# Patient Record
Sex: FEMALE | Race: BLACK OR AFRICAN AMERICAN | NOT HISPANIC OR LATINO
[De-identification: names, ages, dates, MRNs, and addresses within clinical notes are randomized per-mention and may not be internally consistent; named-entity substitution may affect disease eponyms.]

---

## 2021-11-30 PROBLEM — Z00.00 ENCOUNTER FOR PREVENTIVE HEALTH EXAMINATION: Status: ACTIVE | Noted: 2021-11-30

## 2021-12-01 ENCOUNTER — APPOINTMENT (OUTPATIENT)
Dept: ORTHOPEDIC SURGERY | Facility: CLINIC | Age: 61
End: 2021-12-01
Payer: COMMERCIAL

## 2021-12-01 VITALS — HEIGHT: 63 IN | BODY MASS INDEX: 28.7 KG/M2 | WEIGHT: 162 LBS

## 2021-12-01 DIAGNOSIS — Z87.39 PERSONAL HISTORY OF OTHER DISEASES OF THE MUSCULOSKELETAL SYSTEM AND CONNECTIVE TISSUE: ICD-10-CM

## 2021-12-01 DIAGNOSIS — D68.9 COAGULATION DEFECT, UNSPECIFIED: ICD-10-CM

## 2021-12-01 DIAGNOSIS — S46.002A UNSPECIFIED INJURY OF MUSCLE(S) AND TENDON(S) OF THE ROTATOR CUFF OF LEFT SHOULDER, INITIAL ENCOUNTER: ICD-10-CM

## 2021-12-01 PROCEDURE — 20611 DRAIN/INJ JOINT/BURSA W/US: CPT | Mod: LT

## 2021-12-01 PROCEDURE — 73030 X-RAY EXAM OF SHOULDER: CPT | Mod: LT

## 2021-12-01 PROCEDURE — 99204 OFFICE O/P NEW MOD 45 MIN: CPT | Mod: 25

## 2021-12-01 RX ORDER — TRAMADOL HYDROCHLORIDE 50 MG/1
50 TABLET, COATED ORAL
Qty: 60 | Refills: 0 | Status: ACTIVE | COMMUNITY
Start: 2021-12-01 | End: 1900-01-01

## 2021-12-01 NOTE — HISTORY OF PRESENT ILLNESS
[de-identified] : CHRONIC LEFT SHOULDER PAIN \par PAIN STARTED AUGUST 2021- MIGHT HAVE BEEN FROM CARRYING A BAG \par RADIATE UP NECK \par ACHY \par PAIN LEVEL: 10/10\par BETTER WITH MOTRIN, TYLENOL,REST, HEAT\par WORSE WITH OVER HEAD LIFTING, REACHING BEHIND, LYING DOWN, SITTING, AT NIGHT \par PT IS GOING TO P.T SINCE AUGUST 2021\par PT HAD CORTISONE INJECTION INJ IN HE PAST- HELPFUL \par PT WAS TOLD SHE HAD A PARTIAL TEAR IN THE PAST \par

## 2021-12-01 NOTE — DISCUSSION/SUMMARY
[de-identified] : ULTRASOUND EVALUATION  REVEALS INFLAMMATORY CHANGES WITHOUT PARTIAL TEAR \par PATIENT HAS ELECTED TO PROCEED WITH KENALOG INJECTION SHOULDER \par RISKS AND BENEFITS DISCUSSED - VERBAL CONSENT OBTAINED \par \par \par POST INJECTION INSTRUCTIONS\par \par COLD THERAPY , ANALGESICS PRN\par \par HOME  EXERCISES QD  PENDULUM / AAROM HANDOUT PROVIDED, REVIEWED AND DEMONSTRATED \par \par MRI TO EVALUATE TEAR \par

## 2021-12-01 NOTE — PROCEDURE
[de-identified] : DIAGNOSTIC ULTRASOUND LEFT SHOULDER\par \par DIAGNOSTIC SONOGRAPHY of the Rotator Cuff Soft Tissue of the LEFT  SHOULDER was performed in Multiple Scan Planes with varying transducer frequencies.\par Imaging of the Supraspinatus Tendon reveals TENDONITIS, BURSITIS, ARTICULAR SIDE DEGENERATION  WITH OUT SIGNIFICANT / COMPLETE TEAR\par Imaging of the Biceps Tendon reveals no significant tear.\par Imaging of the Subscapularis Tendon reveals no significant tear.\par Imaging of the Infraspinatus Tendon reveals no significant tear.\par Key images were save digitally and reviewed with patient.\par \par \par INJECTION LEFT SHOULDER SA SPACE\par \par Patient has demonstrated limited relief from NSAIDS, rest, exercises / PT, and after discussion of the risks and benefits, the patient has elected to proceed with an ULTRASOUND GUIDED injection into the LEFT SUBACROMIAL  SPACE LATERAL APPROACH \par  \par Confirmed that the patient does not have history of prior adverse reactions, active, infections, or relevant allergies. There was no effusion, erythema, or warmth, and the skin was clear\par \par The skin was sterilized with alcohol. Ethyl Chloride was used as a topical anesthetic. Routine sterile technique. \par The site was injected UTILIZING ULTRASOUND GUIDANCE to confirm appropriate placement of the needle-\par with a mixture of medication and local anesthetic. The injection was completed without complication and a bandage was applied.\par  \par The patient tolerated the procedure well and was given post-injection instructions.Rec: Cold therapy, analgesics, avoid heavy activity.\par MEDICATION: 4cc of 1% xylocaine + 10mg of KENALOG\par \par

## 2021-12-01 NOTE — PHYSICAL EXAM
[de-identified] : PHYSICAL EXAM LEFT  SHOULDER\par \par MILD  SCAPULAR PROTRACTION\par AROM 70 / 70 \par TENDER: SA REGION ANTERIOR AND LATERAL\par \par SPECIAL TESTING :\par MERA - POSITIVE \par EPIFANIO - POSITIVE \par SPEED TEST - POSITIVE\par \par ARAGON - NEGATIVE \par APPREHENSION AND SUPPRESSION - NEGATIVE \par \par RC STRENGTH TESTING \par SS:  5/5\par SUB 5/5\par IS     5/5\par BICEPS  5/5\par \par SENSATION  - GROSSLY INTACT\par \par \par  [de-identified] : LEFT SHOULDER XRAY (2 VIEWS - AP AND OUTLET) -  \par NO OBVIOUS FRACTURE , SEPARATION OR DISLOCATION \par NO SIGNIFICANT OSTEOARTHRITIS, \par TYPE 2B ACROMION - LOW LYING\par CSA=34.3\par

## 2021-12-22 ENCOUNTER — APPOINTMENT (OUTPATIENT)
Dept: ORTHOPEDIC SURGERY | Facility: CLINIC | Age: 61
End: 2021-12-22
Payer: COMMERCIAL

## 2021-12-22 PROCEDURE — 99213 OFFICE O/P EST LOW 20 MIN: CPT | Mod: 95

## 2021-12-22 NOTE — DISCUSSION/SUMMARY
[de-identified] : SHOULDER SURGERY DISCUSSION:\par \par PROCEDURE DISCUSSED - QUESTIONS ANSWERED\par PATIENT WISHES TO PROCEED\par \par POST OP CARE AND LIMITATIONS REVIEWED - HANDOUT PROVIDED \par \par COLD PACKS RECOMMENDED\par ANALGESICS PRESCRIBED \par \par \par THERE ARE NO GUARANTEES THAT ALL SYMPTOMS WILL BE ALLEVIATED  \par SHOULDER ARTHROSCOPY, ACROMIOPLASTY, DEBRIDEMENT,  RC REPAIR AND LABRUM REPAIRS- ON AVERAGE 75- 85% SATISFACTORY RESULTS FOR TEARS < 3CM AFTER 9-12 MONTHS HEALING AND REHABILITATION. \par \par REPAIRS WILL REQUIRE STRICT SHOULDER IMMOBILIZER 4-6 WEEKS\par \par RC TEARS 3CM OR LARGER MAY REQUIRE COLLAGEN PATCH AUGMENTATION GENERALLY HAVE LESS SATISFACTORY RESULTS\par \par PHYSICAL THERAPY REQUIRED 2X WEEK FOR  MINIMUM 8-12 WEEKS FOR ALL PROCEDURES \par CONTINUED HOME EXERCISES 6-9 MONTHS AFTER THAT REQUIRED FOR OPTIMAL OUTCOMES \par \par ROUTINE SURGICAL AND ANESTHETIC RISKS INCLUDE RISK OF SURGICAL INFECTION, ANESTHETIC COMPLICATION OR ALLERGY, POSSIBLE RETEARS OR PROGRESSION OF TEAR, STIFFNESS OF SHOULDER AND UNSATISFACTORY OUTCOMES\par \par PATIENT UNDERSTANDS AND WISHES TO PROCEED\par

## 2021-12-22 NOTE — HISTORY OF PRESENT ILLNESS
[de-identified] : CHRONIC LEFT SHOULDER PAIN \par PAIN STARTED AUGUST 2021- MIGHT HAVE BEEN FROM CARRYING A BAG \par DEC 1, 2021- CORTISONE INJ- HELPFUL \par PAIN LEVEL: 5/10, 9-10/10 AT NIGHT \par BETTER WITH MOTRIN, TYLENOL,REST, HEAT\par WORSE WITH OVER HEAD LIFTING, REACHING BEHIND, LYING DOWN, SITTING, AT NIGHT \par PT IS GOING TO P.T SINCE AUGUST 2021 - NO RELIEF\par PT HAD CORTISONE INJECTION INJ IN HE PAST- ONLY TEMPORARY RELIEF\par PT WAS TOLD SHE HAD A PARTIAL TEAR IN THE PAST \par MRI RESULTS TODAY \par

## 2021-12-22 NOTE — PHYSICAL EXAM
[de-identified] : PHYSICAL EXAM LEFT  SHOULDER - LAST VISIT\par \par MILD  SCAPULAR PROTRACTION\par AROM 70 / 70 \par TENDER: SA REGION ANTERIOR AND LATERAL\par \par SPECIAL TESTING :\par MERA - POSITIVE \par EPIFANIO - POSITIVE \par SPEED TEST - POSITIVE\par \par ARAGON - NEGATIVE \par APPREHENSION AND SUPPRESSION - NEGATIVE \par \par RC STRENGTH TESTING \par SS:  5/5\par SUB 5/5\par IS     5/5\par BICEPS  5/5\par \par SENSATION  - GROSSLY INTACT\par \par \par  [de-identified] : IMPRESSION:  MRI of the left shoulder demonstrates:\par \par Supraspinatus tendinosis with partial-thickness articular surface and bursal surface tears of anterior supraspinatus tendon.\par \par Infraspinatus tendinosis with low-grade partial-thickness articular surface tear.\par \par Subscapularis tendinosis.\par \par Mild intramuscular edema at inferoposterior aspect of supraspinatus muscle; normal remaining muscles of rotator cuff.\par \par Superoposterior labral tear (SLAP 2).\par \par Mild osteoarthritis at glenohumeral joint.\par \par Small effusion at glenohumeral joint.\par \par Attenuation of intracapsular portion of long bicipital tendon consistent with partial tear.\par \par Mild thickening of axillary recess and fibrosis in rotator interval, which may reflect adhesive capsulitis; recommend clinical correlation.\par \par Mild osteoarthritis at acromioclavicular joint. Downsloping acromion. Small acromial spur.\par \par Mild subacromial-subdeltoid bursitis.

## 2022-01-12 ENCOUNTER — APPOINTMENT (OUTPATIENT)
Dept: ORTHOPEDIC SURGERY | Facility: CLINIC | Age: 62
End: 2022-01-12
Payer: COMMERCIAL

## 2022-01-12 PROCEDURE — 99213 OFFICE O/P EST LOW 20 MIN: CPT | Mod: 95

## 2022-01-12 RX ORDER — ONDANSETRON 8 MG/1
8 TABLET, ORALLY DISINTEGRATING ORAL 3 TIMES DAILY
Qty: 15 | Refills: 0 | Status: ACTIVE | COMMUNITY
Start: 2022-01-12 | End: 1900-01-01

## 2022-01-12 RX ORDER — OXYCODONE AND ACETAMINOPHEN 7.5; 325 MG/1; MG/1
7.5-325 TABLET ORAL
Qty: 42 | Refills: 0 | Status: ACTIVE | COMMUNITY
Start: 2022-01-12 | End: 1900-01-01

## 2022-01-12 NOTE — HISTORY OF PRESENT ILLNESS
[Other Location: e.g. School (Enter Location, City,State)___] : at [unfilled], at the time of the visit. [Medical Office: ([facility])___] : at the medical office located in  [Verbal consent obtained from patient] : the patient, [unfilled] [de-identified] : CHRONIC LEFT SHOULDER PAIN \par PAIN STARTED AUGUST 2021- MIGHT HAVE BEEN FROM CARRYING A BAG \par DEC 1, 2021- CORTISONE INJ- HELPFUL \par PAIN LEVEL: 5/10 \par JAN 18, 2022- LEFT POSSIBLE REPAIR PARTIAL, RANDALL, DEBRIDEMENT, SYNOVECTOMY \par BETTER WITH MOTRIN, TYLENOL,REST, HEAT\par WORSE WITH OVER HEAD LIFTING, REACHING BEHIND, LYING DOWN, SITTING, AT NIGHT \par PT IS GOING TO P.T SINCE AUGUST 2021 - NO RELIEF\par PT HAD CORTISONE INJECTION INJECTION IN HE PAST- ONLY TEMPORARY RELIEF\par PT WAS TOLD SHE HAD A PARTIAL TEAR IN THE PAST \par ICE AND MEDICATION AS NEEDED \par

## 2022-01-15 ENCOUNTER — LABORATORY RESULT (OUTPATIENT)
Age: 62
End: 2022-01-15

## 2022-01-18 ENCOUNTER — APPOINTMENT (OUTPATIENT)
Dept: ORTHOPEDIC SURGERY | Facility: AMBULATORY SURGERY CENTER | Age: 62
End: 2022-01-18
Payer: COMMERCIAL

## 2022-01-18 PROCEDURE — 29826 SHO ARTHRS SRG DECOMPRESSION: CPT | Mod: LT

## 2022-01-18 PROCEDURE — 29820 SHO ARTHRS SRG PRTL SYNVCT: CPT | Mod: LT,59

## 2022-01-18 PROCEDURE — 29823 SHO ARTHRS SRG XTNSV DBRDMT: CPT | Mod: LT,59

## 2022-01-21 ENCOUNTER — APPOINTMENT (OUTPATIENT)
Dept: ORTHOPEDIC SURGERY | Facility: CLINIC | Age: 62
End: 2022-01-21
Payer: COMMERCIAL

## 2022-01-21 PROCEDURE — 99024 POSTOP FOLLOW-UP VISIT: CPT

## 2022-01-21 PROCEDURE — 73030 X-RAY EXAM OF SHOULDER: CPT | Mod: LT

## 2022-01-21 RX ORDER — IBUPROFEN 600 MG/1
600 TABLET, FILM COATED ORAL 3 TIMES DAILY
Qty: 90 | Refills: 0 | Status: ACTIVE | COMMUNITY
Start: 2022-01-21 | End: 1900-01-01

## 2022-01-21 NOTE — PHYSICAL EXAM
[de-identified] : POST OP SHOULDER EXAM \par \par PORTALS HEALING WELL - NO ERYTHEMA OR CALOR\par SUTURES REMOVED, STERISTRIPS AND WATERPROOF BANDAIDS  APPLIED \par \par AROM  PEND AAROM \par \par DISTAL CMS INTACT\par  [de-identified] : LEFT SHOULDER XRAY (2 VIEWS - AP AND OUTLET)  -  \par NO OBVIOUS FRACTURE OR DISLOCATION, \par SATISFACTORY DECOMPRESSION NOTED  , \par \par

## 2022-01-21 NOTE — HISTORY OF PRESENT ILLNESS
[de-identified] : CHRONIC LEFT SHOULDER PAIN \par PAIN STARTED AUGUST 2021- MIGHT HAVE BEEN FROM CARRYING A BAG \par DEC 1, 2021- CORTISONE INJ- HELPFUL \par PAIN LEVEL:4 /10 \par JANUARY 18, 2022 - LEFT RANDALL, PASTA 3-4MM\par BETTER WITH MOTRIN, TYLENOL,REST, HEAT\par WORSE WITH OVER HEAD LIFTING, REACHING BEHIND, LYING DOWN, SITTING, AT NIGHT \par PT IS GOING TO P.T SINCE AUGUST 2021 - NO RELIEF\par PT HAD CORTISONE INJECTION INJECTION IN HE PAST- ONLY TEMPORARY RELIEF\par PT WAS TOLD SHE HAD A PARTIAL TEAR IN THE PAST \par ICE AND MEDICATION AS NEEDED \par

## 2022-01-21 NOTE — DISCUSSION/SUMMARY
[de-identified] : JANUARY 18, 2022 - LEFT RANDALL, PASTA 3-4MM..\par \par POST OP RANDALL\par \par COLD THERAPY,ANALGESICS AS NEEDED- WEAN NARCOTICS\par \par DISCONTINUE SLING  - DESKTOP WORK ALLOWED\par \par START PENDULUM EXERCISES, AAROM - DEMONSTARTED \par \par RESUME CARDIO 2 WEEKS - NO SHOULDER CHEST WEIGHTS\par \par START PT WITHIMN 7-14 DAY\par OFFICE FU 6 WEEKS\par

## 2022-03-04 ENCOUNTER — APPOINTMENT (OUTPATIENT)
Dept: ORTHOPEDIC SURGERY | Facility: CLINIC | Age: 62
End: 2022-03-04
Payer: COMMERCIAL

## 2022-03-04 PROCEDURE — 99024 POSTOP FOLLOW-UP VISIT: CPT

## 2022-03-04 NOTE — PHYSICAL EXAM
[de-identified] : POST OP SHOULDER EXAM \par \par PORTALS HEALING WELL - NO ERYTHEMA OR CALOR\par  \par \par AROM  120 / 110 \par \par DISTAL CMS INTACT\par

## 2022-03-04 NOTE — RETURN TO WORK/SCHOOL
[Return Date: _____] : as of [unfilled].  This has been discussed in detail with ~Orly~ and ~he/she~ understands this. [Full Duty] : full duty

## 2022-03-04 NOTE — HISTORY OF PRESENT ILLNESS
[de-identified] : CHRONIC LEFT SHOULDER PAIN \par PAIN LEVEL:5/10 \par JANUARY 18, 2022 - LEFT RANDALL, PASTA 3-4MM\par GOING TO P.T. 3 X WEEKS \par NOT TAKING OXYCODONE \par \par \par \par PAIN STARTED AUGUST 2021- MIGHT HAVE BEEN FROM CARRYING A BAG \par DEC 1, 2021- CORTISONE INJ- HELPFUL \par PAIN LEVEL:5/10 \par JANUARY 18, 2022 - LEFT RANDALL, PASTA 3-4MM\par BETTER WITH MOTRIN, TYLENOL,REST, HEAT\par WORSE WITH OVER HEAD LIFTING, REACHING BEHIND, LYING DOWN, SITTING, AT NIGHT \par PT IS GOING TO P.T SINCE AUGUST 2021 - NO RELIEF\par PT HAD CORTISONE INJECTION INJECTION IN HE PAST- ONLY TEMPORARY RELIEF\par PT WAS TOLD SHE HAD A PARTIAL TEAR IN THE PAST \par

## 2022-03-04 NOTE — DISCUSSION/SUMMARY
[de-identified] : JANUARY 18, 2022 - LEFT RANDALL, PASTA 3-4MM..\par \par POST OP RANDALL\par \par COLD THERAPY,ANALGESICS AS NEEDED- WEAN NARCOTICS\par \par DISCONTINUE SLING  - DESKTOP WORK ALLOWED\par \par START PENDULUM EXERCISES, AAROM - DEMONSTARTED \par \par RESUME CARDIO 2 WEEKS - NO SHOULDER CHEST WEIGHTS\par \par START PT WITHIMN 7-14 DAY\par OFFICE FU 6 WEEKS\par

## 2022-04-04 ENCOUNTER — APPOINTMENT (OUTPATIENT)
Dept: ORTHOPEDIC SURGERY | Facility: CLINIC | Age: 62
End: 2022-04-04
Payer: COMMERCIAL

## 2022-04-04 DIAGNOSIS — M75.42 IMPINGEMENT SYNDROME OF LEFT SHOULDER: ICD-10-CM

## 2022-04-04 DIAGNOSIS — M75.112 INCOMPLETE ROTATOR CUFF TEAR OR RUPTURE OF LEFT SHOULDER, NOT SPECIFIED AS TRAUMATIC: ICD-10-CM

## 2022-04-04 PROCEDURE — 99024 POSTOP FOLLOW-UP VISIT: CPT

## 2022-04-04 NOTE — DISCUSSION/SUMMARY
[de-identified] : JANUARY 18, 2022 - LEFT RANDALL, PASTA 3-4MM..\par \par POST OP RANDALL\par \par HEAT OR COLD THERAPY,ANALGESICS AS NEEDED- WEAN NARCOTICS\par \par COMPLETE P.T.\par PROGRESS TO HOME EXERCISES\par I DEMONSTRATED ER AND OR STRETCHING DAILY \par \par \par \par \par

## 2022-04-04 NOTE — PHYSICAL EXAM
[de-identified] : POST OP SHOULDER EXAM \par \par PORTALS HEALING WELL - NO ERYTHEMA OR CALOR\par  \par \par AROM  LEFT  135 / 120 / 70 / 20\par \par DISTAL CMS INTACT\par

## 2022-04-04 NOTE — HISTORY OF PRESENT ILLNESS
[de-identified] :  LEFT SHOULDER POSTOP 12 WEEKS \par PAIN LEVEL: 1/10 \par JANUARY 18, 2022 - LEFT RANDALL, PASTA 3-4MM\par PATIENT IS GOING TO P.T 2 X WEEK- HELPFUL \par AT HOME EXERCISES-HELPFUL  \par NOT TAKING OXYCODONE \par \par \par \par PAIN STARTED AUGUST 2021- MIGHT HAVE BEEN FROM CARRYING A BAG \par DEC 1, 2021- CORTISONE INJ- HELPFUL \par PAIN LEVEL:5/10 \par JANUARY 18, 2022 - LEFT RANDALL, PASTA 3-4MM\par BETTER WITH MOTRIN, TYLENOL,REST, HEAT\par WORSE WITH OVER HEAD LIFTING, REACHING BEHIND, LYING DOWN, SITTING, AT NIGHT \par PT IS GOING TO P.T SINCE AUGUST 2021 - NO RELIEF\par PT HAD CORTISONE INJECTION INJECTION IN HE PAST- ONLY TEMPORARY RELIEF\par PT WAS TOLD SHE HAD A PARTIAL TEAR IN THE PAST \par

## 2022-09-13 ENCOUNTER — APPOINTMENT (OUTPATIENT)
Dept: OTOLARYNGOLOGY | Facility: CLINIC | Age: 62
End: 2022-09-13

## 2022-09-13 ENCOUNTER — NON-APPOINTMENT (OUTPATIENT)
Age: 62
End: 2022-09-13

## 2022-09-13 VITALS
SYSTOLIC BLOOD PRESSURE: 118 MMHG | HEART RATE: 79 BPM | DIASTOLIC BLOOD PRESSURE: 73 MMHG | HEIGHT: 63 IN | WEIGHT: 164 LBS | TEMPERATURE: 97.8 F | BODY MASS INDEX: 29.06 KG/M2

## 2022-09-13 PROCEDURE — 31579 LARYNGOSCOPY TELESCOPIC: CPT

## 2022-09-13 PROCEDURE — 99205 OFFICE O/P NEW HI 60 MIN: CPT | Mod: 25

## 2022-09-13 RX ORDER — OMEPRAZOLE 40 MG/1
40 CAPSULE, DELAYED RELEASE ORAL
Qty: 30 | Refills: 3 | Status: ACTIVE | COMMUNITY
Start: 2022-09-13 | End: 1900-01-01

## 2022-09-13 RX ORDER — FAMOTIDINE 20 MG/1
20 TABLET, FILM COATED ORAL
Qty: 30 | Refills: 3 | Status: ACTIVE | COMMUNITY
Start: 2022-09-13 | End: 1900-01-01

## 2022-09-13 NOTE — ASSESSMENT
[FreeTextEntry1] :  62-year-old female who presents for second opinion.  In the past she states that she has had tonsil stones as well as vocal fold nodules.  She has undergone voice therapy for the nodules with some improvement in terms of voice.  That being said her biggest  complaints are throat closing when lying supine and sleeping, feeling like throat is closing.   she does have a known diagnosis of THELMA but does not use CPAP.   I do recommend continued follow-up with sleep surgery.  Also recommending continued follow-up with speech pathology for the vocal fold nodules.\par \par In terms of the sensation of throat closing and tightening when lying supine as well as mucus production, based on history and physical exam, I do believe there is a component of laryngopharyngeal reflux.  At this time I am recommending dietary and behavioral change to reduce acid in the diet.  I am also recommending omeprazole as well famotidine for a 3 months trial.\par \par - Recommend continuing follow-up with sleep surgery\par -  continue to work with speech therapy\par - Dietary and behavioral modification to reduce acid reflux, handout given\par - Omeprazole qd as well as Famotidine qhs\par - Voice hygiene, increase hydration, sips of water throughout the day, avoid throat clearing\par - fu 3 mo\par

## 2022-09-13 NOTE — HISTORY OF PRESENT ILLNESS
[de-identified] : 63 yo female who presents with concern for tonsil stones and vocal nodules.\par \par She has had tonsil stones for 3 years.  She will get this constantly, but they don't come out.  She feels that there is something in her throat, worse on the left side.  She feels that there is mucus that comes up, especially at night time.  No issues chewing, eating, or swallowing.  She will have issues with "breathing" where she feels that her throat is closing at night time.\par \par She notes that her voice can get hoarse, tired and dry.  This has been present for 4 years now.  This will come and go.  Today she feels well.  She was recently dx with vocal fold nodules at [facility], and she underwent voice therapy while there.  Has known acid reflux x 2 years, and takes pepcid every night depending on diet.  Has had sleep study in the past with mod THELMA,\par \par Diet:\par + 1 cup caffeine daily, mint gum, limited tomato, onion, garlic,   \par - chocolate, blueberries, alcohol\par 3 glasses of water daily

## 2022-09-13 NOTE — PROCEDURE
[de-identified] : -\par Procedure Note\par \par Pre-operative Diagnosis:  dysphonia discomfort\par Post-operative Diagnosis:  laryngopharyngeal reflux, vocal fold nodules\par Anesthesia: Topical - 1 % Lidocaine/Phenylephrine\par Procedure:  Flexible Laryngoscopy with Stroboscopy\par  \par Procedure Details:  \par The patient was placed in the sitting position.  After decongestant and anesthesia were applied the laryngoscope was passed.  The nasal cavities, nasopharynx, oropharynx, hypopharynx, and larynx were all examined.  Vocal folds were examined during respiration and phonation.  The following findings were noted:\par \par Findings:  \par Nose: Septum is midline, turbinates are normal, nasal airways patent, mucosa normal\par Nasopharynx: Adenoids normal, no masses, eustachian tube normal\par Oropharynx: Pharyngeal walls symmetric and without lesion. Tonsils/fossae symmetric\par Hypopharynx: Hypopharynx and pyriform sinuses without lesion. No masses or asymmetry.  No pooling of secretions.\par Larynx:  Epiglottis and aryepiglottic folds were sharp and crisp bilaterally.  Bilateral false vocal folds normal appearance. Airway was widely patent.  +post cricoid edema\par \par Strobe Exam Ratings\par 		\par TVF Appearance: small vocal fold nodule\par TVF Mobility:  normal\par Edema/hypertrophy:  post cricoid\par Mucus on TVF:  minimal\par Glottic Closure:  adequate\par Mucosal Wave:  normal\par Amplitude of Vibration:  normal\par Phase:  symmetric\par Supraglottic Hyperfunction:  minimal\par Other Findings:\par \par Condition: Stable.  Patient tolerated procedure well.\par \par Complications: None\par

## 2022-09-21 ENCOUNTER — APPOINTMENT (OUTPATIENT)
Dept: ORTHOPEDIC SURGERY | Facility: CLINIC | Age: 62
End: 2022-09-21

## 2022-09-21 DIAGNOSIS — M75.41 IMPINGEMENT SYNDROME OF RIGHT SHOULDER: ICD-10-CM

## 2022-09-21 DIAGNOSIS — S46.001A UNSPECIFIED INJURY OF MUSCLE(S) AND TENDON(S) OF THE ROTATOR CUFF OF RIGHT SHOULDER, INITIAL ENCOUNTER: ICD-10-CM

## 2022-09-21 PROCEDURE — 73030 X-RAY EXAM OF SHOULDER: CPT | Mod: RT

## 2022-09-21 PROCEDURE — 99214 OFFICE O/P EST MOD 30 MIN: CPT | Mod: 25

## 2022-09-21 PROCEDURE — 20611 DRAIN/INJ JOINT/BURSA W/US: CPT

## 2022-09-21 NOTE — HISTORY OF PRESENT ILLNESS
[de-identified] : RIGHT SHOULDER PAIN \par PAIN STARED 3 MONTHS AGO-NO SPECIFIC INJURY \par WORSE AT NIGHT, SLEEPING, LYING DOWN, OVER HEAD LIFTING, LIMITED ROM \par PAIN LEVEL: 5/10 \par ACHY \par BETTER WITH IBUPROFEN, RESTING , EXERCISE \par

## 2022-09-21 NOTE — PROCEDURE
[de-identified] : DIAGNOSTIC ULTRASOUND RIGHT SHOULDER\par \par DIAGNOSTIC SONOGRAPHY of the Rotator Cuff Soft Tissue of the RIGHT SHOULDER was performed in Multiple Scan Planes with varying transducer frequencies.\par Imaging of the Supraspinatus Tendon reveals TENDONITIS, BURSITIS, BURSAL SIDE FRAYING WITH OUT SIGNIFICANT / COMPLETE TEAR\par Imaging of the Biceps Tendon reveals no significant tear.\par Imaging of the Subscapularis Tendon reveals no significant tear.\par Imaging of the Infraspinatus Tendon reveals no significant tear.\par Key images were save digitally and reviewed with patient.\par \par \par \par INJECTION RIGHT SHOULDER SA SPACE\par \par Patient has demonstrated limited relief from NSAIDS, rest, exercises / PT, and after discussion of the risks and benefits, the patient has elected to proceed with an ULTRASOUND GUIDED injection into the RIGHT SUBACROMIAL  SPACE LATERAL APPROACH \par  \par Confirmed that the patient does not have history of prior adverse reactions, active, infections, or relevant allergies. There was no effusion, erythema, or warmth, and the skin was clear\par \par The skin was sterilized with alcohol. Ethyl Chloride was used as a topical anesthetic. Routine sterile technique. \par The site was injected UTILIZING ULTRASOUND GUIDANCE to confirm appropriate placement of the needle-\par with a mixture of medication and local anesthetic. The injection was completed without complication and a bandage was applied.\par  \par The patient tolerated the procedure well and was given post-injection instructions.Rec: Cold therapy, analgesics, avoid heavy activity.\par MEDICATION: 4cc of 1% xylocaine + 40mg of KENALOG\par \par

## 2022-09-21 NOTE — DISCUSSION/SUMMARY
[de-identified] : ULTRASOUND EVALUATION  REVEALS INFLAMMATORY CHANGES WITHOUT SIGNIFICANT TEAR \par PATIENT HAS ELECTED TO PROCEED WITH KENALOG INJECTION SHOULDER \par RISKS AND BENEFITS DISCUSSED - VERBAL CONSENT OBTAINED \par SEE PROCEDURE NOTE\par \par \par POST INJECTION INSTRUCTIONS:\par \par INJECTION THERAPY HANDOUT PROVIDED\par \par COLD THERAPY , ANALGESICS PRN\par \par HOME  EXERCISES QD - TBAND 2 \par \par MRI IF NO RELIEF\par

## 2022-09-21 NOTE — PHYSICAL EXAM
[de-identified] : PHYSICAL EXAM  RIGHT  SHOULDER\par \par MODERATE SCAPULAR PROTRACTION\par AROM 140 / 140 / 90 / 30\par TENDER: SA REGION ANTERIOR AND LATERAL \par \par SPECIAL TESTING :\par MERA - POSITIVE \par EPIFANIO - POSITIVE \par SPEED TEST - POSITIVE\par \par ARAGON - NEGATIVE \par APPREHENSION AND SUPPRESSION - NEGATIVE \par \par RC STRENGTH TESTING \par SS:  5/5\par SUB 5/5\par IS     5/5\par BICEPS  5/5\par \par SENSATION  - GROSSLY INTACT\par \par \par  [de-identified] : RIGHT SHOULDER XRAY (2 VIEWS - AP AND OUTLET) -  \par NO OBVIOUS FRACTURE ,  SEPARATION OR DISLOCATION \par NO SIGNIFICANT OSTEOARTHRITIS,\par tiny posterior calcifications \par TYPE 3B ACROMION + anterior spur\par CSA= 43\par

## 2022-09-27 ENCOUNTER — APPOINTMENT (OUTPATIENT)
Dept: OTOLARYNGOLOGY | Facility: CLINIC | Age: 62
End: 2022-09-27

## 2022-09-27 PROCEDURE — 99213 OFFICE O/P EST LOW 20 MIN: CPT | Mod: 95

## 2022-09-27 NOTE — ASSESSMENT
[FreeTextEntry1] :  62-year-old female who presents for second opinion.  In the past she states that she has had tonsil stones as well as vocal fold nodules.  She has undergone voice therapy for the nodules with some improvement in terms of voice.  That being said her biggest  complaints are throat closing when lying supine and sleeping, feeling like throat is closing.   she does have a known diagnosis of THELMA but does not use CPAP.   I do recommend continued follow-up with sleep surgery.  Also recommending continued follow-up with speech pathology for the vocal fold nodules.\par \par In terms of the sensation of throat closing and tightening when lying supine as well as mucus production, based on history and physical exam, I do believe there is a component of laryngopharyngeal reflux.  At this time I am recommending dietary and behavioral change to reduce acid in the diet.  I am also recommending omeprazole as well famotidine for a 3 months trial.\par \par   Today the patient had some concerns regarding the antireflux medications including some side effects including early onset dementia, and osteoporosis.  I explained that these are very rare with short-term use but can occur with long-term use.  All questions and concerns were addressed.  Patient can use calcium supplementation if using the PPI.  Patient will try these medications for 3 months and follow-up at that time for repeat evaluation.\par \par - Recommend continuing follow-up with sleep surgery\par -  continue to work with speech therapy\par - Dietary and behavioral modification to reduce acid reflux, handout given\par - Omeprazole qd as well as Famotidine qhs\par - Voice hygiene, increase hydration, sips of water throughout the day, avoid throat clearing\par - fu 3 mo\par

## 2022-09-27 NOTE — HISTORY OF PRESENT ILLNESS
[de-identified] : 61 yo female who presents with concern for tonsil stones and vocal nodules.\par \par She has had tonsil stones for 3 years.  She will get this constantly, but they don't come out.  She feels that there is something in her throat, worse on the left side.  She feels that there is mucus that comes up, especially at night time.  No issues chewing, eating, or swallowing.  She will have issues with "breathing" where she feels that her throat is closing at night time.\par \par She notes that her voice can get hoarse, tired and dry.  This has been present for 4 years now.  This will come and go.  Today she feels well.  She was recently dx with vocal fold nodules at [facility], and she underwent voice therapy while there.  Has known acid reflux x 2 years, and takes pepcid every night depending on diet.  Has had sleep study in the past with mod THELMA,\par \par Diet:\par + 1 cup caffeine daily, mint gum, limited tomato, onion, garlic,   \par - chocolate, blueberries, alcohol\par 3 glasses of water daily\par - [FreeTextEntry1] :  9/27/2022\par  no change in symptoms.  Patient has been adhering to a low acid diet.  She presents today to review some questions and concerns regarding the antireflux medications.

## 2022-09-27 NOTE — REASON FOR VISIT
[Home] : at home, [unfilled] , at the time of the visit. [Medical Office: ([facility])___] : at the medical office located in  [Patient] : the patient [Subsequent Evaluation] : a subsequent evaluation for [FreeTextEntry2] : Throat discomfort

## 2022-12-12 ENCOUNTER — APPOINTMENT (OUTPATIENT)
Dept: OTOLARYNGOLOGY | Facility: CLINIC | Age: 62
End: 2022-12-12

## 2022-12-16 ENCOUNTER — APPOINTMENT (OUTPATIENT)
Dept: OTOLARYNGOLOGY | Facility: CLINIC | Age: 62
End: 2022-12-16

## 2022-12-16 VITALS
TEMPERATURE: 98 F | OXYGEN SATURATION: 98 % | DIASTOLIC BLOOD PRESSURE: 70 MMHG | HEART RATE: 87 BPM | SYSTOLIC BLOOD PRESSURE: 120 MMHG | WEIGHT: 164 LBS | BODY MASS INDEX: 29.06 KG/M2 | HEIGHT: 63 IN

## 2022-12-16 PROCEDURE — 99215 OFFICE O/P EST HI 40 MIN: CPT | Mod: 25

## 2022-12-16 PROCEDURE — 31579 LARYNGOSCOPY TELESCOPIC: CPT

## 2022-12-16 NOTE — ASSESSMENT
[FreeTextEntry1] :  62-year-old female who presents for second opinion.  In the past she states that she has had tonsil stones as well as vocal fold nodules.  She has undergone voice therapy for the nodules with some improvement in terms of voice.  That being said her biggest  complaints are throat closing when lying supine and sleeping, feeling like throat is closing.   she does have a known diagnosis of THELMA but does not use CPAP.   I do recommend continued follow-up with sleep surgery.  Also recommending continued follow-up with speech pathology for the vocal fold nodules.\par \par In terms of the sensation of throat closing and tightening when lying supine as well as mucus production, based on history and physical exam, I do believe there is a component of laryngopharyngeal reflux.  At this time I am recommending dietary and behavioral change to reduce acid in the diet.  I am also recommending omeprazole as well famotidine for a 3 months trial.\par \par   Today the patient had some concerns regarding the antireflux medications including some side effects including early onset dementia, and osteoporosis.  I explained that these are very rare with short-term use but can occur with long-term use.  All questions and concerns were addressed.  Patient can use calcium supplementation if using the PPI.  Patient will try these medications for 3 months and follow-up at that time for repeat evaluation.\par \par   12/16/2022: Today patient notes significant improvement (80%) in terms of symptomatology.  She is very happy with the results.  Still at times when lying supine she still feels some discomfort.  At this time I am recommending the use of Gaviscon alginate as she does not like using omeprazole.  She will try this and follow-up in 2 to 3 months for repeat evaluation.  In terms of nodules they do remain however slightly smaller and appear softer.  Would recommend follow-up with speech pathology and again we will reevaluate in 2 to 3 months.\par \par - Recommend continuing follow-up with sleep surgery\par -  continue to work with speech therapy\par - Dietary and behavioral modification to reduce acid reflux, handout given\par -  Famotidine qhs,  consider Gaviscon alginate\par - Voice hygiene, increase hydration, sips of water throughout the day, avoid throat clearing\par - fu 2-3 mo\par

## 2022-12-16 NOTE — HISTORY OF PRESENT ILLNESS
[de-identified] : 9/13/22\par 63 yo female who presents with concern for tonsil stones and vocal nodules.\par \par She has had tonsil stones for 3 years.  She will get this constantly, but they don't come out.  She feels that there is something in her throat, worse on the left side.  She feels that there is mucus that comes up, especially at night time.  No issues chewing, eating, or swallowing.  She will have issues with "breathing" where she feels that her throat is closing at night time.\par \par She notes that her voice can get hoarse, tired and dry.  This has been present for 4 years now.  This will come and go.  Today she feels well.  She was recently dx with vocal fold nodules at [facility], and she underwent voice therapy while there.  Has known acid reflux x 2 years, and takes pepcid every night depending on diet.  Has had sleep study in the past with mod THELMA,\par \par Diet:\par + 1 cup caffeine daily, mint gum, limited tomato, onion, garlic,   \par - chocolate, blueberries, alcohol\par 3 glasses of water daily\par -\par  9/27/2022\par  no change in symptoms.  Patient has been adhering to a low acid diet.  She presents today to review some questions and concerns regarding the antireflux medications.\par - [FreeTextEntry1] : 12/16/22\par Patient has been adhering to antireflux medication including famotidine  (Unable to take  omeprazole due to GI upset).  She has been trying her best with antireflux diet.  She notes an 80% improvement in terms of symptomatology.  She is very happy with the results.  At times she still feels some left-sided throat discomfort when lying supine.  She also notes improvement in terms of her voice.  She has not followed up with speech pathology.  She denies any new, ear, nose, throat symptoms.    She will be getting her CPAP shortly after she just completed a titration study.

## 2022-12-16 NOTE — PROCEDURE
[de-identified] :  -\par Procedure Note\par \par Pre-operative Diagnosis: dysphonia discomfort\par Post-operative Diagnosis: laryngopharyngeal reflux, vocal fold nodules\par Anesthesia: Topical - 1 % Lidocaine/Phenylephrine\par Procedure: Flexible Laryngoscopy with Stroboscopy\par  \par Procedure Details: \par The patient was placed in the sitting position. After decongestant and anesthesia were applied the laryngoscope was passed. The nasal cavities, nasopharynx, oropharynx, hypopharynx, and larynx were all examined. Vocal folds were examined during respiration and phonation. The following findings were noted:\par \par Findings: \par Nose: Septum is midline, turbinates are normal, nasal airways patent, mucosa normal\par Nasopharynx: Adenoids normal, no masses, eustachian tube normal\par Oropharynx: Pharyngeal walls symmetric and without lesion. Tonsils/fossae symmetric\par Hypopharynx: Hypopharynx and pyriform sinuses without lesion. No masses or asymmetry. No pooling of secretions.\par Larynx: Epiglottis and aryepiglottic folds were sharp and crisp bilaterally. Bilateral false vocal folds normal appearance. Airway was widely patent. +post cricoid edema\par \par Strobe Exam Ratings\par 		\par TVF Appearance: small vocal fold nodule\par TVF Mobility: normal\par Edema/hypertrophy: post cricoid\par Mucus on TVF: minimal\par Glottic Closure: adequate\par Mucosal Wave: normal\par Amplitude of Vibration: normal\par Phase: symmetric\par Supraglottic Hyperfunction: minimal\par Other Findings:\par \par Condition: Stable. Patient tolerated procedure well.\par \par Complications: None\par . \par

## 2023-02-03 ENCOUNTER — APPOINTMENT (OUTPATIENT)
Dept: OTOLARYNGOLOGY | Facility: CLINIC | Age: 63
End: 2023-02-03
Payer: COMMERCIAL

## 2023-02-03 VITALS
HEIGHT: 63 IN | HEART RATE: 68 BPM | WEIGHT: 164 LBS | BODY MASS INDEX: 29.06 KG/M2 | OXYGEN SATURATION: 98 % | SYSTOLIC BLOOD PRESSURE: 121 MMHG | DIASTOLIC BLOOD PRESSURE: 77 MMHG

## 2023-02-03 DIAGNOSIS — J38.2 NODULES OF VOCAL CORDS: ICD-10-CM

## 2023-02-03 DIAGNOSIS — G47.33 OBSTRUCTIVE SLEEP APNEA (ADULT) (PEDIATRIC): ICD-10-CM

## 2023-02-03 DIAGNOSIS — K21.9 GASTRO-ESOPHAGEAL REFLUX DISEASE W/OUT ESOPHAGITIS: ICD-10-CM

## 2023-02-03 PROCEDURE — 99215 OFFICE O/P EST HI 40 MIN: CPT | Mod: 25

## 2023-02-03 PROCEDURE — 31579 LARYNGOSCOPY TELESCOPIC: CPT

## 2023-02-03 NOTE — ASSESSMENT
[FreeTextEntry1] :  62-year-old female   Who is known to me for multiple ENT issues.  In terms of obstructive sleep apnea she now has a diagnosis via polysomnogram.  She is followed with sleep medicine and is now using CPAP.  In terms of her voice she notes improvement in terms of her vocal quality, she will follow-up with her outside speech pathologist, or possibly even in [place] where she will be for the next few months.  Exam today shows small vocal fold nodules unchanged from previous.  These appeared to not be affecting her voice quality significantly.  In terms of her throat she also notes improvement having continued with Gaviscon and famotidine as well as dietary and behavioral changes.  At this point she is already been off of the antireflux medications and she has done well and I am recommending slowly reintroducing foods to her diet.  At this time no further ENT interventions and she can follow-up with me as needed.\par \par –Continue to follow-up with outside speech pathology for small vocal fold nodules\par –Continue voice hygiene\par –Continue with CPAP\par – Reintroduce foods into diet\par – Follow-up with me as needed

## 2023-02-03 NOTE — REASON FOR VISIT
[Subsequent Evaluation] : a subsequent evaluation for [FreeTextEntry2] : kristen and LPR Doesn't smoke, drink or use illicit substances currently  Former smoker, 1ppd for 25 years, quit 10 years ago when throat cancer was discovered

## 2023-02-03 NOTE — HISTORY OF PRESENT ILLNESS
[de-identified] : 9/13/22\par 63 yo female who presents with concern for tonsil stones and vocal nodules.\par \par She has had tonsil stones for 3 years.  She will get this constantly, but they don't come out.  She feels that there is something in her throat, worse on the left side.  She feels that there is mucus that comes up, especially at night time.  No issues chewing, eating, or swallowing.  She will have issues with "breathing" where she feels that her throat is closing at night time.\par \par She notes that her voice can get hoarse, tired and dry.  This has been present for 4 years now.  This will come and go.  Today she feels well.  She was recently dx with vocal fold nodules at [facility], and she underwent voice therapy while there.  Has known acid reflux x 2 years, and takes pepcid every night depending on diet.  Has had sleep study in the past with mod THELMA,\par \par Diet:\par + 1 cup caffeine daily, mint gum, limited tomato, onion, garlic,   \par - chocolate, blueberries, alcohol\par 3 glasses of water daily\par -\par  9/27/2022\par  no change in symptoms.  Patient has been adhering to a low acid diet.  She presents today to review some questions and concerns regarding the antireflux medications.\par -\par 12/16/22\par Patient has been adhering to antireflux medication including famotidine  (Unable to take  omeprazole due to GI upset).  She has been trying her best with antireflux diet.  She notes an 80% improvement in terms of symptomatology.  She is very happy with the results.  At times she still feels some left-sided throat discomfort when lying supine.  She also notes improvement in terms of her voice.  She has not followed up with speech pathology.  She denies any new, ear, nose, throat symptoms.    She will be getting her CPAP shortly after she just completed a titration study.\par - [FreeTextEntry1] : 2/3/23\par Patient is overall doing very well.  In terms of obstructive sleep apnea she is now using CPAP.  She finds it a little irritating but is getting used to this.    In terms of her voice she does work with an outside speech pathologist with some evaluation pending.  That being said she feels that her voice has continued to improve and is essentially back to her baseline.  In terms of throat related issues she also notes significant improvement and is essentially back to baseline.  She is followed voice hygiene with increased hydration and sips of water throughout the day. she is already stopped taking the antireflux medications.  She denies any new ear, nose, throat symptoms otherwise.

## 2023-02-03 NOTE — PROCEDURE
[de-identified] : -\par Procedure Note\par \par Pre-operative Diagnosis: dysphonia discomfort\par Post-operative Diagnosis: laryngopharyngeal reflux, vocal fold nodules\par Anesthesia: Topical - 1 % Lidocaine/Phenylephrine\par Procedure: Flexible Laryngoscopy with Stroboscopy\par  \par Procedure Details: \par The patient was placed in the sitting position. After decongestant and anesthesia were applied the laryngoscope was passed. The nasal cavities, nasopharynx, oropharynx, hypopharynx, and larynx were all examined. Vocal folds were examined during respiration and phonation. The following findings were noted:\par \par Findings: \par Nose: Septum is midline, turbinates are normal, nasal airways patent, mucosa normal\par Nasopharynx: Adenoids normal, no masses, eustachian tube normal\par Oropharynx: Pharyngeal walls symmetric and without lesion. Tonsils/fossae symmetric\par Hypopharynx: Hypopharynx and pyriform sinuses without lesion. No masses or asymmetry. No pooling of secretions.\par Larynx: Epiglottis and aryepiglottic folds were sharp and crisp bilaterally. Bilateral false vocal folds normal appearance. Airway was widely patent. +post cricoid edema\par \par Strobe Exam Ratings\par 		\par TVF Appearance: small vocal fold nodule\par TVF Mobility: normal\par Edema/hypertrophy: post cricoid\par Mucus on TVF: minimal\par Glottic Closure: adequate\par Mucosal Wave: normal\par Amplitude of Vibration: normal\par Phase: symmetric\par Supraglottic Hyperfunction: minimal\par Other Findings:\par \par Condition: Stable. Patient tolerated procedure well.\par \par Complications: None\par . \par  \par

## 2024-10-29 ENCOUNTER — APPOINTMENT (OUTPATIENT)
Dept: ORTHOPEDIC SURGERY | Age: 64
End: 2024-10-29
Payer: COMMERCIAL

## 2024-10-29 VITALS — DIASTOLIC BLOOD PRESSURE: 77 MMHG | HEART RATE: 68 BPM | OXYGEN SATURATION: 100 % | SYSTOLIC BLOOD PRESSURE: 125 MMHG

## 2024-10-29 DIAGNOSIS — M47.816 SPONDYLOSIS W/OUT MYELOPATHY OR RADICULOPATHY, LUMBAR REGION: ICD-10-CM

## 2024-10-29 PROCEDURE — 99204 OFFICE O/P NEW MOD 45 MIN: CPT

## 2024-10-29 RX ORDER — MELOXICAM 7.5 MG/1
7.5 TABLET ORAL
Qty: 30 | Refills: 1 | Status: ACTIVE | COMMUNITY
Start: 2024-10-29 | End: 1900-01-01

## 2025-02-11 ENCOUNTER — APPOINTMENT (OUTPATIENT)
Dept: ORTHOPEDIC SURGERY | Age: 65
End: 2025-02-11